# Patient Record
Sex: MALE | Race: WHITE
[De-identification: names, ages, dates, MRNs, and addresses within clinical notes are randomized per-mention and may not be internally consistent; named-entity substitution may affect disease eponyms.]

---

## 2018-03-24 ENCOUNTER — HOSPITAL ENCOUNTER (EMERGENCY)
Dept: HOSPITAL 97 - ER | Age: 18
LOS: 1 days | Discharge: HOME | End: 2018-03-25
Payer: SELF-PAY

## 2018-03-24 DIAGNOSIS — L02.415: Primary | ICD-10-CM

## 2018-03-24 DIAGNOSIS — Z88.1: ICD-10-CM

## 2018-03-24 DIAGNOSIS — R06.2: ICD-10-CM

## 2018-03-24 PROCEDURE — 96375 TX/PRO/DX INJ NEW DRUG ADDON: CPT

## 2018-03-24 PROCEDURE — 96372 THER/PROPH/DIAG INJ SC/IM: CPT

## 2018-03-24 PROCEDURE — 99284 EMERGENCY DEPT VISIT MOD MDM: CPT

## 2018-03-24 PROCEDURE — 96361 HYDRATE IV INFUSION ADD-ON: CPT

## 2018-03-24 PROCEDURE — 96374 THER/PROPH/DIAG INJ IV PUSH: CPT

## 2018-03-24 NOTE — ER
Nurse's Notes                                                                                     

 [facility]                                                                

Name: Antony Feliciano                                                                               

Age: 17 yrs                                                                                       

Sex: Male                                                                                         

: 2000                                                                                   

MRN: [id number]                                                                                   

Arrival Date: 2018                                                                          

Time: 19:53                                                                                       

Account#: [id number]                                                                            

Bed 3                                                                                             

Private MD:                                                                                       

Diagnosis: Cutaneous abscess of right axilla;Cutaneous abscess of right lower limb;Severe allergic

  reaction to po medication given in ED (Bactrim or Keflex)                                       

                                                                                                  

Presentation:                                                                                     

                                                                                             

20:29 Presenting complaint: Patient states: Small abscesses to right hip and right axilla;    lp1 

      Noticed them 1 week ago; Denies any fever. Transition of care: patient was not received     

      from another setting of care. Onset of symptoms was 2018. Care prior to           

      arrival: None.                                                                              

20:29 Method Of Arrival: Ambulatory                                                           lp1 

20:29 Acuity: NIMESH 4                                                                           lp1 

                                                                                                  

Historical:                                                                                       

- Allergies:                                                                                      

20:32 NKA;                                                                                    lp1 

- Home Meds:                                                                                      

20:32 None [Active];                                                                          lp1 

- PMHx:                                                                                           

20:32 Ulcers;                                                                                 lp1 

- PSHx:                                                                                           

20:32 None;                                                                                   lp1 

                                                                                                  

- Immunization history:: Adult Immunizations up to date.                                          

- Social history:: Smoking status: Patient/guardian denies using tobacco.                         

                                                                                                  

                                                                                                  

Screenin:33 Abuse screen: Denies threats or abuse. Denies injuries from another. Nutritional        lp1 

      screening: No deficits noted. Tuberculosis screening: No symptoms or risk factors           

      identified. Fall Risk None identified.                                                      

                                                                                                  

Assessment:                                                                                       

21:09 General: Appears in no apparent distress. uncomfortable, Behavior is calm, cooperative, bs1 

      appropriate for age. Pain: Complains of pain in right axilla, right hip Pain does not       

      radiate. Pain currently is 3 out of 10 on a pain scale. Neuro: Level of Consciousness       

      is awake, alert, obeys commands, Oriented to person, place, time, situation,                

      Appropriate for age  are equal bilaterally Moves all extremities. Gait is steady,      

      Speech is normal, Facial symmetry appears normal. Cardiovascular: Denies chest pain,        

      palpitations, shortness of breath, Heart tones S1 S2 present Capillary refill < 3           

      seconds Patient's skin is warm and dry. Respiratory: Airway is patent Trachea midline       

      Respiratory effort is even, unlabored, Respiratory pattern is regular, symmetrical,         

      Breath sounds are clear bilaterally. GI: No deficits noted. No signs and/or symptoms        

      were reported involving the gastrointestinal system. : No deficits noted. No signs        

      and/or symptoms were reported regarding the genitourinary system. EENT: No deficits         

      noted. No signs and/or symptoms were reported regarding the EENT system. Derm: Skin         

      abscess note to right axilla and right hip area. No open wound noted, no drainage. Skin     

      is pink, warm \T\ dry. Abscess located on right hip (quarter size), right axilla (dime      

      size). Musculoskeletal: Circulation, motion, and sensation intact. Capillary refill < 3     

      seconds, Range of motion: intact in all extremities.                                        

22:03 General: Hibiclens applied to right axilla/right hip..                                  bs1 

22:09 Reassessment: Patient appears in no apparent distress at this time. Patient and/or      bs1 

      family updated on plan of care and expected duration. Pain level reassessed. Patient is     

      alert, oriented x 3, equal unlabored respirations, skin warm/dry/pink. Patient denies       

      pain at this time. Patient states feeling better.                                           

22:50 Reassessment: Pt returned to [facility] after just a few minutes of being discharged with    fc  

      shortness of breath and hives. Pt also flushed. States that he is unable to catch his       

      breath. Pt taken to room and Nisa NP in to see him.                                       

23:00 Reassessment: Patient came back to ER for reaction to bactrim/keflex given to patient   bs1 

      prior to discharge from hospital. Generalized hives noted to body, SOB noted, wheezing.     

23:00 General: Appears distressed, Behavior is anxious.                                       bs1 

23:00 Pain: Denies pain. Neuro: Level of Consciousness is awake, alert, obeys commands,       bs1 

      Oriented to person, place, time, situation, Appropriate for age  are equal             

      bilaterally Moves all extremities. Gait is steady. Cardiovascular: Reports shortness of     

      breath, Denies chest pain, Heart tones S1 S2 present Capillary refill < 3 seconds           

      Patient's skin is warm and dry. Respiratory: Reports shortness of breath at rest on         

      exertion Airway is patent Trachea midline Respiratory effort is even, Respiratory           

      pattern is tachypnea expiratory wheezes heard on auscultation. GI: No deficits noted.       

      No signs and/or symptoms were reported involving the gastrointestinal system. : No        

      deficits noted. No signs and/or symptoms were reported regarding the genitourinary          

      system. EENT: No deficits noted. No signs and/or symptoms were reported regarding the       

      EENT system. Derm: Rash noted that is papular, urticaria, on generalized rash/hives.        

      Musculoskeletal: Circulation, motion, and sensation intact. Capillary refill < 3            

      seconds.                                                                                    

23:52 Reassessment: Patient appears in no apparent distress at this time. Patient is alert,   bs1 

      oriented x 3, equal unlabored respirations, skin warm/dry/pink. No signs of respiratory     

      distress noted. Lung sounds clear. Patient states symptoms have improved.                   

                                                                                             

01:46 Reassessment: Patient appears in no apparent distress at this time. Patient and/or      tl2 

      family updated on plan of care and expected duration. Pain level reassessed. Patient is     

      alert, oriented x 3, equal unlabored respirations, skin warm/dry/pink. Pt and family        

      verbalized understanding of discharge instructions, need for follow up and prescription     

      usage Patient states feeling better. Patient states symptoms have improved.                 

                                                                                                  

Vital Signs:                                                                                      

                                                                                             

20:32  / 84 RA Sitting; Pulse 69; Resp 18; Temp 98.4(TE); Pulse Ox 98% on R/A; Weight   lp1 

      99.79 kg; Height 5 ft. 6 in. (167.64 cm);                                                   

21:10  / 86; Pulse 60; Resp 18; Pulse Ox 100% on R/A;                                   mt  

22:24  / 75; Pulse 63; Resp 17; Temp 98.0(O); Pulse Ox 96% on R/A; Pain 0/10;           bs1 

23:52  / 86; Pulse 82; Resp 16; Pulse Ox 99% on R/A;                                    mt  

                                                                                             

00:00  / 69; Pulse 80; Resp 17; Pulse Ox 99% on R/A; Pain 0/10;                         bs1 

01:10  / 99; Pulse 73; Resp 17; Pulse Ox 99% on R/A;                                    mt  

01:46  / 69; Pulse 64; Resp 18; Pulse Ox 96% on R/A;                                    tl2 

                                                                                             

20:32 Body Mass Index 35.51 (99.79 kg, 167.64 cm)                                             lp1 

                                                                                                  

ED Course:                                                                                        

                                                                                             

19:53 Patient arrived in ED.                                                                  al2 

20:31 Triage completed.                                                                       lp1 

20:31 Arm band placed on left wrist.                                                          lp1 

21:06 [facility] dur[facility]C [facility]                                                        snw 

21:07 Ky Min MD is Attending Physician.                                             snw 

21:09 Na Kraft RN is Primary Nurse.                                                 bs1 

21:14 Patient has correct armband on for positive identification. Bed in low position. Call   bs1 

      light in reach. Side rails up X 1.                                                          

22:16 No provider procedures requiring assistance completed. Patient did not have IV access   bs1 

      during this emergency room visit.                                                           

22:50 Missed attempt(s): 20 gauge in right antecubital area.                                  fc  

22:53 Primary Nurse role handed off by Na Kraft, RN                                  em1 

23:02 Inserted saline lock: 22 gauge in right hand, using aseptic technique. By Naya feliciano.    bs1 

23:08 Na Kraft RN is Primary Nurse.                                                 bs1 

                                                                                             

01:46 IV discontinued, intact, bleeding controlled, No redness/swelling at site. Pressure     tl2 

      dressing applied.                                                                           

                                                                                                  

Administered Medications:                                                                         

                                                                                             

22:03 Drug: Hibiclens 4 % 1 application {Note: Right axilla/right hip.} Route: Topical; Site: bs1 

      affected area;                                                                              

22:04 Drug: KeFLEX 500 mg Route: PO;                                                          bs1 

22:25 Follow up: Response: No adverse reaction                                                bs1 

22:04 Drug: Bactrim (160 mg-800 mg (DS) 1 tablet Route: PO;                                   bs1 

22:25 Follow up: Response: No adverse reaction                                                bs1 

22:04 Not Given (Patient Refused): Norco 5 mg-325 mg 1 tabs PO once                           bs1 

23:00 Drug: EPINEPHrine 1mg/mL 1:1,000 0.3 ml Route: Sub-Q; Site: right upper arm;            tl2 

                                                                                             

00:19 Follow up: Response: No adverse reaction                                                bs1 

                                                                                             

23:03 Drug: Benadryl 25 mg {Note: Partially administered by Naya Feliciano RN.} Route: IVP; Site: bs1 

      right hand;                                                                                 

                                                                                             

00:18 Follow up: Response: No adverse reaction                                                bs1 

                                                                                             

23:04 Drug: Albuterol 2.5 mg Route: Inhalation;                                               lp1 

23:04 Drug: Pepcid 20 mg Route: IVP; Site: right hand;                                        bs1 

                                                                                             

00:18 Follow up: Response: No adverse reaction                                                bs1 

                                                                                             

23:04 Drug: SOLU-Medrol 125 mg {Note: Administered by Naya Feliciano RN.} Route: IVP; Site: right bs1 

      hand;                                                                                       

                                                                                             

00:18 Follow up: Response: No adverse reaction; Wheezing diminished                           bs1 

                                                                                             

23:22 Drug: NS 0.9% 1000 ml Route: IV; Rate: 1000 ml; Site: right hand;                       bs1 

                                                                                             

00:18 Follow up: IV Status: Completed infusion                                                bs1 

                                                                                                  

                                                                                                  

Outcome:                                                                                          

                                                                                             

21:44 Discharge ordered by MD.                                                                snw 

22:26 Discharged to home ambulatory.                                                          bs1 

22:26 Condition: stable                                                                           

22:26 Discharge instructions given to patient, Instructed on discharge instructions, follow       

      up and referral plans. medication usage, Demonstrated understanding of instructions,        

      follow-up care, medications, Prescriptions given X 2.                                       

22:26 Patient left the ED.                                                                    bs1 

                                                                                             

01:09 Discharge ordered by MD.                                                                adilene 

01:47 Patient left the ED.                                                                    tl2 

                                                                                                  

Signatures:                                                                                       

Nisa Soto, ABDONP-C                 FNP-Csnw                                                  

Ariadna Richey, RN                   RN   [place]                               em1                                                  

Sophia Dooley RN                         RN   lp1                                                  

Gloria Dickinson RN                        RN   tl2                                                  

Gemini Torres mt, Brittany RN                   RN   bs1                                                  

Lori Martino2                                                  

                                                                                                  

Corrections: (The following items were deleted from the chart)                                    

                                                                                             

20:35 20:32 Pulse 69bpm; Resp 18bpm; Pulse Ox 98% RA; Temp 98.4F Temporal; 99.79 kg; Height 5 lp1 

      ft. 6 in.; BMI: 35.5; lp1                                                                   

20:35 20:32 Pulse 69bpm; Resp 18bpm; Pulse Ox 98% RA; Temp 98.4F Temporal; 99.79 kg; Height 5 lp1 

      ft. 6 in.; BMI: 35.5; lp1                                                                   

                                                                                                  

**************************************************************************************************

## 2018-03-24 NOTE — EDPHYS
Physician Documentation                                                                           

 [facility]                                                                

Name: Antony Feliciano                                                                               

Age: 17 yrs                                                                                       

Sex: Male                                                                                         

: 2000                                                                                   

MRN: [id number]                                                                                   

Arrival Date: 2018                                                                          

Time: 19:53                                                                                       

Account#: [id number]                                                                            

Bed 3                                                                                             

Private MD:                                                                                       

ED Physician Ky Min                                                                      

HPI:                                                                                              

                                                                                             

21:40 This 17 yrs old  Male presents to ER via Ambulatory with complaints of Boil.   snw 

21:40 The patient presents with an abscess of the right hip and right axilla. Description:    snw 

      The affected area is very small, erythematous. Onset: The symptoms/episode                  

      began/occurred gradually, 7 day(s) ago, and became persistent. Possible cause(s):           

      unknown. Severity of symptoms: At their worst the symptoms were mild. The patient has       

      not experienced similar symptoms in the past. It is unknown whether or not the patient      

      has recently seen a physician.                                                              

                                                                                                  

Historical:                                                                                       

- Allergies:                                                                                      

20:32 NKA;                                                                                    lp1 

- Home Meds:                                                                                      

20:32 None [Active];                                                                          lp1 

- PMHx:                                                                                           

20:32 Ulcers;                                                                                 lp1 

- PSHx:                                                                                           

20:32 None;                                                                                   lp1 

                                                                                                  

- Immunization history:: Adult Immunizations up to date.                                          

- Social history:: Smoking status: Patient/guardian denies using tobacco.                         

                                                                                                  

                                                                                                  

ROS:                                                                                              

21:40 Constitutional: Negative for fever, chills, and weight loss, Eyes: Negative for injury, snw 

      pain, redness, and discharge, ENT: Negative for injury, pain, and discharge, Neck:          

      Negative for injury, pain, and swelling, Cardiovascular: Negative for chest pain,           

      palpitations, and edema, Respiratory: Negative for shortness of breath, cough,              

      wheezing, and pleuritic chest pain, Abdomen/GI: Negative for abdominal pain, nausea,        

      vomiting, diarrhea, and constipation, Back: Negative for injury and pain, : Negative      

      for injury, bleeding, discharge, and swelling, MS/Extremity: Negative for injury and        

      deformity, Neuro: Negative for headache, weakness, numbness, tingling, and seizure.         

21:40 Skin: Positive for abscess.                                                                 

                                                                                                  

Exam:                                                                                             

21:40 Constitutional:  This is a well developed, well nourished patient who is awake, alert,  snw 

      and in no acute distress. Head/Face:  Normocephalic, atraumatic. Eyes:  Pupils equal        

      round and reactive to light, extra-ocular motions intact.  Lids and lashes normal.          

      Conjunctiva and sclera are non-icteric and not injected.  Cornea within normal limits.      

      Periorbital areas with no swelling, redness, or edema. ENT:  Nares patent. No nasal         

      discharge, no septal abnormalities noted.  Tympanic membranes are normal and external       

      auditory canals are clear.  Oropharynx with no redness, swelling, or masses, exudates,      

      or evidence of obstruction, uvula midline.  Mucous membranes moist. Neck:  Trachea          

      midline, no thyromegaly or masses palpated, and no cervical lymphadenopathy.  Supple,       

      full range of motion without nuchal rigidity, or vertebral point tenderness.  No            

      Meningismus. Chest/axilla:  Normal chest wall appearance and motion.  Nontender with no     

      deformity.  No lesions are appreciated. Cardiovascular:  Regular rate and rhythm with a     

      normal S1 and S2.  No gallops, murmurs, or rubs.  Normal PMI, no JVD.  No pulse             

      deficits. Respiratory:  Lungs have equal breath sounds bilaterally, clear to                

      auscultation and percussion.  No rales, rhonchi or wheezes noted.  No increased work of     

      breathing, no retractions or nasal flaring. Abdomen/GI:  Soft, non-tender, with normal      

      bowel sounds.  No distension or tympany.  No guarding or rebound.  No evidence of           

      tenderness throughout. Back:  No spinal tenderness.  No costovertebral tenderness.          

      Full range of motion. MS/ Extremity:  Pulses equal, no cyanosis.  Neurovascular intact.     

       Full, normal range of motion. Neuro:  Awake and alert, GCS 15, oriented to person,         

      place, time, and situation.  Cranial nerves II-XII grossly intact.  Motor strength 5/5      

      in all extremities.  Sensory grossly intact.  Cerebellar exam normal.  Normal gait.         

      Psych:  Awake, alert, with orientation to person, place and time.  Behavior, mood, and      

      affect are within normal limits.                                                            

21:40 Skin: Appearance: normal except for affected area, abscess, that is small, of the right     

      hip and right axilla, cellulitis, is not appreciated.                                       

                                                                                                  

Vital Signs:                                                                                      

20:32  / 84 RA Sitting; Pulse 69; Resp 18; Temp 98.4(TE); Pulse Ox 98% on R/A; Weight   lp1 

      99.79 kg; Height 5 ft. 6 in. (167.64 cm);                                                   

21:10  / 86; Pulse 60; Resp 18; Pulse Ox 100% on R/A;                                   mt  

22:24  / 75; Pulse 63; Resp 17; Temp 98.0(O); Pulse Ox 96% on R/A; Pain 0/10;           bs1 

23:52  / 86; Pulse 82; Resp 16; Pulse Ox 99% on R/A;                                    mt  

                                                                                             

00:00  / 69; Pulse 80; Resp 17; Pulse Ox 99% on R/A; Pain 0/10;                         bs1 

01:10  / 99; Pulse 73; Resp 17; Pulse Ox 99% on R/A;                                    mt  

01:46  / 69; Pulse 64; Resp 18; Pulse Ox 96% on R/A;                                    tl2 

                                                                                             

20:32 Body Mass Index 35.51 (99.79 kg, 167.64 cm)                                             lp1 

                                                                                                  

MDM:                                                                                              

                                                                                             

21:16 Patient medically screened.                                                             snw 

21:46 Data reviewed: vital signs, nurses notes. Data interpreted: Pulse oximetry: on room air snw 

      is 100 %. Interpretation: normal. Counseling: I had a detailed discussion with the          

      patient and/or guardian regarding: the historical points, exam findings, and any            

      diagnostic results supporting the discharge/admit diagnosis, the presence of at least       

      one elevated blood pressure reading (>120/80) during this emergency department visit,       

      the need for outpatient follow up, to return to the emergency department if symptoms        

      worsen or persist or if there are any questions or concerns that arise at home. Special     

      discussion: I have referred the patient to see his PCP for further evaluation of high       

      blood pressure. Based on the history and exam findings, there is no indication for          

      further emergent testing or inpatient evaluation. I discussed with the patient/guardian     

      the need to see the primary care provider for further evaluation of the symptoms.           

22:55 ED course: Po meds taken at 2145 - 2245 with hives, wheezing, anxiety, swelling. Meds   snw 

      for allergic response given. Will continue to assess.                                       

23:44 Response to treatment: the patient's symptoms have markedly improved after treatment,   snw 

      pt with no wheezing, no further edema, rash resolving. Nasal congestion continues..         

                                                                                             

01:05 Response to treatment: the patient's symptoms have resolved after treatment, the        snw 

      patient's condition has returned to base line.                                              

                                                                                                  

Administered Medications:                                                                         

                                                                                             

22:03 Drug: Hibiclens 4 % 1 application {Note: Right axilla/right hip.} Route: Topical; Site: bs1 

      affected area;                                                                              

22:04 Drug: KeFLEX 500 mg Route: PO;                                                          bs1 

22:25 Follow up: Response: No adverse reaction                                                bs1 

22:04 Drug: Bactrim (160 mg-800 mg (DS) 1 tablet Route: PO;                                   bs1 

22:25 Follow up: Response: No adverse reaction                                                bs1 

22:04 Not Given (Patient Refused): Norco 5 mg-325 mg 1 tabs PO once                           bs1 

23:00 Drug: EPINEPHrine 1mg/mL 1:1,000 0.3 ml Route: Sub-Q; Site: right upper arm;            tl2 

                                                                                             

00:19 Follow up: Response: No adverse reaction                                                bs1 

                                                                                             

23:03 Drug: Benadryl 25 mg {Note: Partially administered by Naya Feliciano RN.} Route: IVP; Site: bs1 

      right hand;                                                                                 

                                                                                             

00:18 Follow up: Response: No adverse reaction                                                bs1 

                                                                                             

23:04 Drug: Albuterol 2.5 mg Route: Inhalation;                                               lp1 

23:04 Drug: Pepcid 20 mg Route: IVP; Site: right hand;                                        bs1 

                                                                                             

00:18 Follow up: Response: No adverse reaction                                                bs1 

                                                                                             

23:04 Drug: SOLU-Medrol 125 mg {Note: Administered by Naya Feliciano RN.} Route: IVP; Site: right bs1 

      hand;                                                                                       

                                                                                             

00:18 Follow up: Response: No adverse reaction; Wheezing diminished                           bs1 

                                                                                             

23:22 Drug: NS 0.9% 1000 ml Route: IV; Rate: 1000 ml; Site: right hand;                       bs1 

                                                                                             

00:18 Follow up: IV Status: Completed infusion                                                bs1 

                                                                                                  

                                                                                                  

Disposition:                                                                                      

18 01:09 Discharged to Home. Impression: Cutaneous abscess of right axilla, Cutaneous       

  abscess of right lower limb, Severe allergic reaction to po medication given                    

  in ED (Bactrim or Keflex).                                                                      

- Condition is Stable.                                                                            

- Discharge Instructions: Abscess, Drug Allergy, Epinephrine Injection, Hypertension.             

- Prescriptions for Clindamycin HCl 300 mg Oral Capsule - take 1 capsule by ORAL route            

  every 6 hours for 10 days; 40 capsule. Prednisone 20 mg Oral Tablet - take 2 tablet             

  by ORAL route once daily for 5 days; 10 tablet. Pepcid 20 mg Oral Tablet - take 1               

  tablet by ORAL route once daily; 20 tablet. EpiPen 0.3 mg Injection auto- injector -            

  inject 1 pen by INTRAMUSCULAR route as directed Inject into the outer portion of the            

  thigh, through clothing if necessary. Indicated in the emergency treatment of                   

  allergic reactions; 1 box.                                                                      

- Work release form, Medication Reconciliation Form, Thank You Letter, Antibiotic                 

  Education, Prescription Opioid Use form.                                                        

- Follow up: Private Physician; When: 1 - 2 days; Reason: Recheck today's complaints,             

  Continuance of care, Re-evaluation by your physician. Follow up: Emergency                      

  Department; When: As needed; Reason: Worsening of condition.                                    

                                                                                                  

                                                                                                  

                                                                                                  

Addendum:                                                                                         

2018                                                                                        

     07:05 Co-signature as Attending Physician, Ky Min MD I agree with the assessment and  w
a

           plan of care.                                                                          

                                                                                                  

Signatures:                                                                                       

Nisa Soto, FNP-C                 FNP-Csnw                                                  

Sophia Dooley RN                         RN   lp1                                                  

Gloria Dickinson RN                        RN   tl2                                                  

Ky Min MD MD   wa                                                   

Na Kraft, RN                   RN   bs1                                                  

                                                                                                  

**************************************************************************************************

## 2018-05-09 ENCOUNTER — HOSPITAL ENCOUNTER (EMERGENCY)
Dept: HOSPITAL 97 - ER | Age: 18
Discharge: HOME | End: 2018-05-09
Payer: SELF-PAY

## 2018-05-09 DIAGNOSIS — S52.612A: ICD-10-CM

## 2018-05-09 DIAGNOSIS — S52.512A: Primary | ICD-10-CM
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
## 2018-05-09 DIAGNOSIS — Z88.2: ICD-10-CM

## 2018-05-09 DIAGNOSIS — Z88.0: ICD-10-CM

## 2018-05-09 DIAGNOSIS — S90.02XA: ICD-10-CM

## 2018-05-09 PROCEDURE — 99285 EMERGENCY DEPT VISIT HI MDM: CPT
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
## 2023-05-25 ENCOUNTER — HOSPITAL ENCOUNTER (EMERGENCY)
Dept: HOSPITAL 97 - ER | Age: 23
Discharge: HOME | End: 2023-05-25
Payer: SELF-PAY

## 2023-05-25 DIAGNOSIS — Z88.0: ICD-10-CM

## 2023-05-25 DIAGNOSIS — L02.212: Primary | ICD-10-CM

## 2023-05-25 DIAGNOSIS — Z88.2: ICD-10-CM

## 2023-05-25 PROCEDURE — 99283 EMERGENCY DEPT VISIT LOW MDM: CPT

## 2023-05-25 NOTE — EDPHYS
Physician Documentation                                                                           

 [facility]                                                                 

Name: Antony Feliciano                                                                               

Age: 23 yrs                                                                                       

Sex: Male                                                                                         

: 2000                                                                                   

MRN: [id number]                                                                                   

Arrival Date: 2023                                                                          

Time: 17:58                                                                                       

Account#: [id number]                                                                            

Bed 9                                                                                             

Private MD:                                                                                       

ED Physician Reece Corral                                                                       

HPI:                                                                                              

                                                                                             

18:57 This 23 yrs old Male presents to ER via Ambulatory with complaints of Multiple boils on kb  

      back and arms.                                                                              

18:57 The patient presents with an abscess of the left low back. Description: erythematous,   kb  

      swollen, warm. Onset: The symptoms/episode began/occurred last week. Possible cause(s):     

      unknown. Associated signs and symptoms: Pertinent positives: drainage, erythema,            

      swelling. Modifying factors: the symptoms are alleviated by nothing, the symptoms are       

      aggravated by nothing. Severity of symptoms: At their worst the symptoms were mild,         

      moderate, in the emergency department the symptoms are unchanged. The patient has not       

      experienced similar symptoms in the past. The patient has not recently seen a physician.    

                                                                                                  

Historical:                                                                                       

- Allergies:                                                                                      

18:21 PENICILLINS;                                                                            bp  

18:21 Sulfa (Sulfonamide Antibiotics);                                                        bp  

- PMHx:                                                                                           

18:21 Ulcers;                                                                                 bp  

                                                                                                  

- Immunization history:: Adult Immunizations up to date.                                          

- Social history:: Smoking status: Reported history of juuling and/or vaping.                     

                                                                                                  

                                                                                                  

ROS:                                                                                              

18:55 Constitutional: Negative for fever, chills, and weight loss.                            kb  

18:55 Skin: Positive for abscess, of the back.                                                    

18:55 All other systems are negative.                                                             

                                                                                                  

Exam:                                                                                             

18:55 Constitutional:  This is a well developed, well nourished patient who is awake, alert,  kb  

      and in no acute distress. Head/Face:  Normocephalic, atraumatic. ENT:  Moist Mucous         

      membranes Respiratory:  Respirations even and unlabored. No increased work of               

      breathing. Talking in full sentences MS/ Extremity:  Pulses equal, no cyanosis.             

      Neurovascular intact.  Full, normal range of motion. Neuro:  Awake and alert, GCS 15,       

      oriented to person, place, time, and situation. Moves all extremities. Normal gait.         

18:55 Skin: abscess, that is small, of the left low back, with drainage, that is purulent,        

      several pustules noted to back.                                                             

                                                                                                  

Vital Signs:                                                                                      

18:20  / 75; Pulse 77; Resp 16; Temp 98; Pulse Ox 98% ;                                 bp  

                                                                                                  

MDM:                                                                                              

18:07 Patient medically screened.                                                             kb  

18:55 Differential diagnosis: abscess, cellulitis. Data reviewed: vital signs, nurses notes.  kb  

      Counseling: I had a detailed discussion with the patient and/or guardian regarding: the     

      historical points, exam findings, and any diagnostic results supporting the                 

      discharge/admit diagnosis, the need for outpatient follow up, a family practitioner, to     

      return to the emergency department if symptoms worsen or persist or if there are any        

      questions or concerns that arise at home.                                                   

                                                                                                  

Administered Medications:                                                                         

No medications were administered                                                                  

                                                                                                  

                                                                                                  

Disposition Summary:                                                                              

23 18:26                                                                                    

Discharge Ordered                                                                                 

      Location: Home                                                                          kb  

      Condition: Stable                                                                       kb  

      Diagnosis                                                                                   

        - Cutaneous abscess of back [any part, except buttock]                                kb  

      Followup:                                                                               kb  

        - With: Emergency Department                                                               

        - When: As needed                                                                          

        - Reason: Worsening of condition                                                           

      Followup:                                                                               kb  

        - With: Private Physician                                                                  

        - When: 2 - 3 days                                                                         

        - Reason: Recheck today's complaints, Continuance of care, Re-evaluation by your           

      physician                                                                                   

      Discharge Instructions:                                                                     

        - Discharge Summary Sheet                                                             kb  

        - Skin Abscess, Easy-to-Read                                                          kb  

      Forms:                                                                                      

        - Medication Reconciliation Form                                                      kb  

        - Thank You Letter                                                                    kb  

        - Antibiotic Education                                                                kb  

        - Prescription Opioid Use                                                             kb  

      Prescriptions:                                                                              

        - Clindamycin HCl 150 mg Oral Capsule                                                      

            - take 1 capsule by ORAL route every 6 hours for 10 days; 40 capsule; Refills: 0, kb  

      Product Selection Permitted                                                                 

Signatures:                                                                                       

Agueda Wall FNP-C FNP-Donald Castellanos, RN                      RN   bp                                                   

                                                                                                  

Corrections: (The following items were deleted from the chart)                                    

18:57 18:55 Skin: abscess, that is small, of the left low back, with drainage, that is        kb  

      purulent, kb                                                                                

                                                                                                  

**************************************************************************************************

## 2023-05-25 NOTE — ER
Nurse's Notes                                                                                     

 [facility]                                                                 

Name: Antony Feliciano                                                                               

Age: 23 yrs                                                                                       

Sex: Male                                                                                         

: 2000                                                                                   

MRN: [id number]                                                                                   

Arrival Date: 2023                                                                          

Time: 17:58                                                                                       

Account#: [id number]                                                                            

Bed 9                                                                                             

Private MD:                                                                                       

Diagnosis: Cutaneous abscess of back [any part, except buttock]                                   

                                                                                                  

Presentation:                                                                                     

                                                                                             

18:20 Chief complaint: Patient states: MX ABSCESS ON BACK. Coronavirus screen: At this time,  bp  

      the client does not indicate any symptoms associated with coronavirus-19. Ebola Screen:     

      No symptoms or risks identified at this time. Initial Sepsis Screen: Does the patient       

      meet any 2 criteria? No. Patient's initial sepsis screen is negative. Does the patient      

      have a suspected source of infection? No. Patient's initial sepsis screen is negative.      

      Risk Assessment: Do you want to hurt yourself or someone else? Patient reports no           

      desire to harm self or others. Onset of symptoms is unknown.                                

18:20 Method Of Arrival: Ambulatory                                                           bp  

18:20 Acuity: NIMESH 4                                                                           bp  

                                                                                                  

Triage Assessment:                                                                                

18:21 General: Appears uncomfortable, Behavior is calm, cooperative, appropriate for age.     bp  

      Pain: Complains of pain in back. EENT: No deficits noted. Neuro: No deficits noted.         

      Cardiovascular: No deficits noted. Respiratory: No deficits noted. GI: No signs and/or      

      symptoms were reported involving the gastrointestinal system. : No signs and/or           

      symptoms were reported regarding the genitourinary system. Derm: Abscess located on         

      back. Musculoskeletal: No deficits noted.                                                   

                                                                                                  

Historical:                                                                                       

- Allergies:                                                                                      

18:21 PENICILLINS;                                                                            bp  

18:21 Sulfa (Sulfonamide Antibiotics);                                                        bp  

- PMHx:                                                                                           

18:21 Ulcers;                                                                                 bp  

                                                                                                  

- Immunization history:: Adult Immunizations up to date.                                          

- Social history:: Smoking status: Reported history of juuling and/or vaping.                     

                                                                                                  

                                                                                                  

Screenin:22 [facility] ED Fall Risk Assessment (Adult) History of falling in the last 3 months,       bp  

      including since admission No falls in past 3 months (0 pts). Abuse screen: Denies           

      threats or abuse. Denies injuries from another. Nutritional screening: No deficits          

      noted. Tuberculosis screening: No symptoms or risk factors identified.                      

                                                                                                  

Assessment:                                                                                       

18:22 General: SEE TRIAGE NOTE.                                                               bp  

                                                                                                  

Vital Signs:                                                                                      

18:20  / 75; Pulse 77; Resp 16; Temp 98; Pulse Ox 98% ;                                 bp  

                                                                                                  

ED Course:                                                                                        

18:06 Patient arrived in ED.                                                                  ts1 

18:06 Agueda Wall FNP-C is [facility].                                                        kb  

18:06 Reece Corral MD is Attending Physician.                                              kb  

18:20 Donald Perdomo, RN is Primary Nurse.                                                    bp  

18:20 Triage completed.                                                                       bp  

18:22 Arm band placed on.                                                                     bp  

18:22 Patient has correct armband on for positive identification. Call light in reach.        bp  

18:34 No provider procedures requiring assistance completed. Patient did not have IV access   bp  

      during this emergency room visit.                                                           

                                                                                                  

Administered Medications:                                                                         

No medications were administered                                                                  

                                                                                                  

                                                                                                  

Medication:                                                                                       

18:22 VIS not applicable for this client.                                                     bp  

                                                                                                  

Outcome:                                                                                          

18:26 Discharge ordered by MD.                                                                kb  

18:34 Discharged to home ambulatory, with family.                                             bp  

18:34 Condition: stable                                                                           

18:34 Discharge instructions given to patient, Instructed on discharge instructions, follow       

      up and referral plans. medication usage, wound care, Demonstrated understanding of          

      instructions, follow-up care, medications, wound care, Prescriptions given X 1.             

18:34 Patient left the ED.                                                                    bp  

                                                                                                  

Signatures:                                                                                       

Agueda Wall FNP-C FNP-Abisaib                                                   

Donald Perdomo, RN                      RN   bp                                                   

Breanne Flores, PAS                     PAS  ts1                                                  

                                                                                                  

**************************************************************************************************

## 2023-07-31 ENCOUNTER — HOSPITAL ENCOUNTER (EMERGENCY)
Dept: HOSPITAL 97 - ER | Age: 23
Discharge: HOME | End: 2023-07-31
Payer: SELF-PAY

## 2023-07-31 VITALS — TEMPERATURE: 97.2 F | SYSTOLIC BLOOD PRESSURE: 132 MMHG | DIASTOLIC BLOOD PRESSURE: 80 MMHG | OXYGEN SATURATION: 99 %

## 2023-07-31 DIAGNOSIS — L02.212: Primary | ICD-10-CM

## 2023-07-31 PROCEDURE — 0H96XZZ DRAINAGE OF BACK SKIN, EXTERNAL APPROACH: ICD-10-PCS

## 2023-07-31 PROCEDURE — 99281 EMR DPT VST MAYX REQ PHY/QHP: CPT

## 2023-07-31 NOTE — EDPHYS
Physician Documentation                                                                           

 [facility]                                                                 

Name: Antony Feliciano                                                                               

Age: 23 yrs                                                                                       

Sex: Male                                                                                         

: 2000                                                                                   

MRN: [id number]                                                                                   

Arrival Date: 2023                                                                          

Time: 12:03                                                                                       

Account#: [id number]                                                                            

Bed Treatment                                                                                     

Private MD:                                                                                       

ED Physician Rico Gonzales                                                                     

HPI:                                                                                              

                                                                                             

12:53 This 23 yrs old Male presents to ER via Ambulatory with complaints of Skin Problem - on kb  

      back.                                                                                       

12:53 The patient presents with an abscess of the right scapular area. Description:           kb  

      erythematous, swollen. Onset: The symptoms/episode began/occurred 1 week(s) ago.            

      Possible cause(s): unknown. Associated signs and symptoms: Pertinent positives:             

      erythema, swelling. Modifying factors: the symptoms are alleviated by nothing, the          

      symptoms are aggravated by nothing. Severity of symptoms: At their worst the symptoms       

      were mild, in the emergency department the symptoms are unchanged. The patient has          

      experienced similar episodes in the past. The patient has not recently seen a physician.    

                                                                                                  

Historical:                                                                                       

- Allergies:                                                                                      

12:31 PENICILLINS;                                                                            iw  

12:31 Sulfa (Sulfonamide Antibiotics);                                                        iw  

- Home Meds:                                                                                      

12:31 None [Active];                                                                          iw  

- PMHx:                                                                                           

12:31 Ulcers;                                                                                 iw  

- PSHx:                                                                                           

12:31 None;                                                                                   iw  

                                                                                                  

- Immunization history:: Client reports having NOT received the Covid vaccine. Last               

  tetanus immunization: unknown.                                                                  

- Social history:: Smoking status: Reported history of juuling and/or vaping.                     

                                                                                                  

                                                                                                  

ROS:                                                                                              

12:52 Constitutional: Negative for fever, chills, and weight loss.                            kb  

12:52 Skin: Positive for abscess, of the right scapular area.                                     

12:52 All other systems are negative.                                                             

                                                                                                  

Exam:                                                                                             

12:52 Constitutional:  This is a well developed, well nourished patient who is awake, alert,  kb  

      and in no acute distress. Head/Face:  Normocephalic, atraumatic. ENT:  Moist Mucous         

      membranes Cardiovascular:  Regular rate and rhythm with a normal S1 and S2.  No             

      gallops, murmurs, or rubs.  No pulse deficits. Respiratory:  Respirations even and          

      unlabored. No increased work of breathing. Talking in full sentences MS/ Extremity:         

      Pulses equal, no cyanosis.  Neurovascular intact.  Full, normal range of motion. Neuro:     

       Awake and alert, GCS 15, oriented to person, place, time, and situation. Moves all         

      extremities. Normal gait.                                                                   

12:52 Skin: abscess, that is moderate sized, of the right scapular area, with drainage, with      

      fluctuance, that is mild.                                                                   

                                                                                                  

Vital Signs:                                                                                      

12:30  / 80; Pulse 70; Resp 16; Temp 97.2; Pulse Ox 99% on R/A; Weight 104.33 kg;       iw  

      Height 5 ft. 9 in. ;                                                                        

12:30 Body Mass Index 33.97 (104.33 kg, 175.26 cm)                                            iw  

                                                                                                  

Procedures:                                                                                       

12:53 I \T\ D: Incision and drainage was performed for an abscess of the right scapular area    kb

      Prepped with Betadine, Anesthetized with 2 ml's 1% Lidocaine. Incised with #11 blade.       

      Drained moderate amount Dressing: sterile 4x4 gauze, the patient tolerated the              

      procedure well.                                                                             

                                                                                                  

MDM:                                                                                              

12:08 Patient medically screened.                                                             kb  

12:55 Differential diagnosis: abscess, allergic reaction, cellulitis, insect bite. Data       kb  

      reviewed: vital signs, nurses notes. Counseling: I had a detailed discussion with the       

      patient and/or guardian regarding: the historical points, exam findings, and any            

      diagnostic results supporting the discharge/admit diagnosis, the need for outpatient        

      follow up, a family practitioner, to return to the emergency department if symptoms         

      worsen or persist or if there are any questions or concerns that arise at home.             

                                                                                                  

                                                                                             

12:13 Order name: I\T\D Setup; Complete Time: 12:38                                             kb

                                                                                                  

Administered Medications:                                                                         

No medications were administered                                                                  

                                                                                                  

                                                                                                  

Disposition:                                                                                      

15:35 Co-signature as Attending Physician, Rico Gonzales MD I reviewed the patient's care   rt  

      provided by the Advanced Practice Provider and agree with the diagnosis and treatment       

      plan.                                                                                       

                                                                                                  

Disposition Summary:                                                                              

23 12:55                                                                                    

Discharge Ordered                                                                                 

      Location: Home                                                                          kb  

      Condition: Stable                                                                       kb  

      Diagnosis                                                                                   

        - Cutaneous abscess of back [any part, except buttock]                                kb  

      Followup:                                                                               kb  

        - With: Emergency Department                                                               

        - When: As needed                                                                          

        - Reason: Worsening of condition                                                           

      Followup:                                                                               kb  

        - With: Private Physician                                                                  

        - When: 2 - 3 days                                                                         

        - Reason: Recheck today's complaints, Continuance of care, Re-evaluation by your           

      physician                                                                                   

      Discharge Instructions:                                                                     

        - Discharge Summary Sheet                                                             kb  

        - Skin Abscess, Easy-to-Read                                                          kb  

        - Incision and Drainage, Care After                                                   kb  

      Forms:                                                                                      

        - Medication Reconciliation Form                                                      kb  

        - Thank You Letter                                                                    kb  

        - Antibiotic Education                                                                kb  

        - Prescription Opioid Use                                                             kb  

        - Patient Portal Instructions                                                         kb  

        - Work release form                                                                   [id number] 

      Prescriptions:                                                                              

        - Doxycycline Hyclate 100 mg Oral Tablet                                                   

            - take 1 tablet by ORAL route every 12 hours; 20 tablet; Refills: 0, Product      kb  

      Selection Permitted                                                                         

Signatures:                                                                                       

Agueda Wall FNP-C FNP-Ckb Williams, Irene, RN                     RN   iw                                                   

Rico Gonzales MD MD   rt                                                   

                                                                                                  

**************************************************************************************************

## 2023-07-31 NOTE — ER
Nurse's Notes                                                                                     

 [facility]                                                                 

Name: Antony Feliciano                                                                               

Age: 23 yrs                                                                                       

Sex: Male                                                                                         

: 2000                                                                                   

MRN: [id number]                                                                                   

Arrival Date: 2023                                                                          

Time: 12:03                                                                                       

Account#: [id number]                                                                            

Bed Treatment                                                                                     

Private MD:                                                                                       

Diagnosis: Cutaneous abscess of back [any part, except buttock]                                   

                                                                                                  

Presentation:                                                                                     

                                                                                             

12:23 Coronavirus screen: At this time, the client does not indicate any symptoms associated  iw  

      with coronavirus-19. Risk Assessment: Do you want to hurt yourself or someone else?         

      Patient reports no desire to harm self or others. Onset of symptoms was 2023.      

12:23 Method Of Arrival: Ambulatory                                                           iw  

12:23 Acuity: NIMESH 3                                                                           iw  

12: Chief complaint: Patient states: has two boils on back and shoulder X 1 week. Ebola     iw  

      Screen: Patient negative for fever greater than or equal to 101.5 degrees Fahrenheit,       

      and additional compatible Ebola Virus Disease symptoms Patient denies exposure to           

      infectious person. Patient denies travel to an Ebola-affected area in the 21 days           

      before illness onset. No symptoms or risks identified at this time. Initial Sepsis          

      Screen: Does the patient meet any 2 criteria? No. Patient's initial sepsis screen is        

      negative. Does the patient have a suspected source of infection? No. Patient's initial      

      sepsis screen is negative.                                                                  

                                                                                                  

Historical:                                                                                       

- Allergies:                                                                                      

12:31 PENICILLINS;                                                                            iw  

12:31 Sulfa (Sulfonamide Antibiotics);                                                        iw  

- Home Meds:                                                                                      

12:31 None [Active];                                                                          iw  

- PMHx:                                                                                           

12:31 Ulcers;                                                                                 iw  

- PSHx:                                                                                           

12:31 None;                                                                                   iw  

                                                                                                  

- Immunization history:: Client reports having NOT received the Covid vaccine. Last               

  tetanus immunization: unknown.                                                                  

- Social history:: Smoking status: Reported history of juuling and/or vaping.                     

                                                                                                  

                                                                                                  

Vital Signs:                                                                                      

12:30  / 80; Pulse 70; Resp 16; Temp 97.2; Pulse Ox 99% on R/A; Weight 104.33 kg;       iw  

      Height 5 ft. 9 in. ;                                                                        

12:30 Body Mass Index 33.97 (104.33 kg, 175.26 cm)                                            iw  

                                                                                                  

ED Course:                                                                                        

12:05 Patient arrived in ED.                                                                  im  

12:08 [facility]                                                        kb  

12:08 Rico Gonzales MD is Attending Physician.                                            kb  

12:25 Triage completed.                                                                       iw  

12:25 Arm band placed on.                                                                     iw  

13:43 Ailyn Collins, RN is Primary Nurse.                                                   iw  

                                                                                                  

Administered Medications:                                                                         

No medications were administered                                                                  

                                                                                                  

                                                                                                  

Outcome:                                                                                          

12:55 Discharge ordered by MD.                                                                kb  

13:43 Patient left the ED.                                                                    iw  

                                                                                                  

Signatures:                                                                                       

Agueda Wall, FNP-C                 FNP-Ckb                                                   

Ailyn Collins, RN                     RN   iw                                                   

Faby Quintanilla                                                                                  

                                                                                                  

Corrections: (The following items were deleted from the chart)                                    

 12:23 Chief complaint: Patient states: bruising to right inner arm since yesterday ,    iw  

      denies injury , bruising to forehead today but dis not fall , also having back pain iw      

 12:23 Initial Sepsis Screen: Does the patient meet any 2 criteria? No. Patient's        iw  

      initial sepsis screen is negative. Does the patient have a suspected source of              

      infection? No. Patient's initial sepsis screen is negative. iw                              

 12:23 Ebola Screen: Patient negative for fever greater than or equal to 101.5 degrees   iw  

      Fahrenheit, and additional compatible Ebola Virus Disease symptoms Patient denies           

      exposure to infectious person. Patient denies travel to an Ebola-affected area in the       

      21 days before illness onset. iw                                                            

12: 12:23  / 92; Pulse 103bpm; Resp 16bpm; Pulse Ox 96% RA; Temp 98.3F; 53.52 kg;     iw  

      Height 5 ft. 6 in.; BMI: 19.0; iw                                                           

12: 12:30 Pulse 70bpm; Resp 16bpm; Pulse Ox 99% RA; Temp 97.2F; 104.33 kg; Height 5 ft. 9   iw  

      in.; BMI: 33.9; iw                                                                          

                                                                                                  

**************************************************************************************************

## 2024-09-10 ENCOUNTER — HOSPITAL ENCOUNTER (EMERGENCY)
Dept: HOSPITAL 97 - ER | Age: 24
Discharge: HOME | End: 2024-09-10
Payer: SELF-PAY

## 2024-09-10 VITALS — SYSTOLIC BLOOD PRESSURE: 144 MMHG | DIASTOLIC BLOOD PRESSURE: 98 MMHG

## 2024-09-10 VITALS — OXYGEN SATURATION: 99 % | TEMPERATURE: 98.4 F

## 2024-09-10 DIAGNOSIS — L03.115: ICD-10-CM

## 2024-09-10 DIAGNOSIS — L02.415: Primary | ICD-10-CM

## 2024-09-10 PROCEDURE — 99284 EMERGENCY DEPT VISIT MOD MDM: CPT

## 2024-09-10 PROCEDURE — 96372 THER/PROPH/DIAG INJ SC/IM: CPT

## 2024-09-10 NOTE — EDPHYS
Physician Documentation                                                                           

 [facility]                                                                 

Name: Antony Feliciano                                                                               

Age: 24 yrs                                                                                       

Sex: Male                                                                                         

: 2000                                                                                   

MRN: [id number]                                                                                   

Arrival Date: 09/10/2024                                                                          

Time: 00:21                                                                                       

Account#: [id number]                                                                            

Bed 15                                                                                            

Private MD:                                                                                       

ED Physician Mani Watson                                                                        

HPI:                                                                                              

09/10                                                                                             

00:34 This 24 yrs old Male presents to ER via Unassigned with complaints of Leg Swelling -    sb4 

      right.                                                                                      

00:34 The patient presents with an abscess of the right quadriceps and right knee.            sb4 

      Description: The affected area is small, draining, erythematous, raised, swollen,           

      tense, warm. Onset: The symptoms/episode began/occurred yesterday. Possible cause(s):       

      unknown. Associated signs and symptoms: The patient has no apparent associated signs or     

      symptoms. The patient has experienced similar episodes in the past, a few times,            

      today's symptoms are similar.                                                               

                                                                                                  

Historical:                                                                                       

- Allergies:                                                                                      

00:39 PENICILLINS;                                                                            ss  

00:39 Sulfa (Sulfonamide Antibiotics);                                                        ss  

- Home Meds:                                                                                      

00:39 None [Active];                                                                          ss  

- PMHx:                                                                                           

00:39 Ulcers;                                                                                 ss  

- PSHx:                                                                                           

00:39 None;                                                                                   ss  

                                                                                                  

- Immunization history:: Client reports having NOT received the Covid vaccine.                    

- Infectious Disease History:: Denies.                                                            

- Social history:: Smoking status: Reported history of juuling and/or vaping.                     

                                                                                                  

                                                                                                  

ROS:                                                                                              

00:34 Constitutional: Negative for fever, chills, and weight loss,                            sb4 

00:34 Skin: Positive for abscess, of the right knee and right quadriceps,                         

00:34 All other systems are negative,                                                             

                                                                                                  

Exam:                                                                                             

00:34 Constitutional:  This is a well developed, well nourished patient who is awake, alert,  sb4 

      and in no acute distress. Head/Face:  Normocephalic, atraumatic. Eyes:  Extra-ocular        

      motions intact.  Periorbital areas with no swelling, redness, or edema. ENT:  Mucous        

      membranes moist.                                                                            

00:34 Skin: abscess, that is small, approximately  3 cm(s), of the right knee, with drainage,     

      with surrounding cellulitis,                                                                

00:37 Skin: abscess, that is small, of the right quadriceps, with drainage, with induration,  sb4 

      with surrounding cellulitis,                                                                

                                                                                                  

Vital Signs:                                                                                      

00:37 Pulse 73; Resp 16; Temp 98.4(O); Pulse Ox 99% ; Weight 104.33 kg; Height 5 ft. 8 in. ;    

      Pain 10/10;                                                                                 

01:12  / 98; Pulse 87; Resp 18; Pulse Ox 99% on R/A;                                    al5 

00:37 Body Mass Index 34.97 (104.33 kg, 172.72 cm)                                            ss  

00:37 Pain Scale: Adult                                                                       ss  

                                                                                                  

MDM:                                                                                              

00:28 Patient medically screened.                                                             sb4 

00:37 Data reviewed: vital signs, nurses notes, and as a result, I will discharge patient.    sb4 

      Counseling: I had a detailed discussion with the patient and/or guardian regarding the      

      historical points, exam findings, and any diagnostic results supporting the                 

      discharge/admit diagnosis, to return to the emergency department if symptoms worsen or      

      persist or if there are any questions or concerns that arise at home.                       

                                                                                                  

Administered Medications:                                                                         

00:40 CANCELLED (Physician Discretion): trimethoprim-sulfamethoxazole(160 mg-800 mg (ds) 1    sb4 

      tablet PO once                                                                              

01:09 Drug: Ketorolac IM 30 mg IM once Route: IM; Site: right deltoid;                        al5 

01:10 Follow up: Response: No adverse reaction; Medication administered at discharge.         al5 

01:09 Drug: Clindamycin  mg PO once Route: PO;                                          al5 

01:09 Follow up: Response: No adverse reaction; Medication administered at discharge.         al5 

                                                                                                  

                                                                                                  

Disposition Summary:                                                                              

09/10/24 00:38                                                                                    

Discharge Ordered                                                                                 

 Notes:       Location: Home                                                                        
  sb4

      Problem: new                                                                            sb4 

      Symptoms: have improved                                                                 sb4 

      Condition: Stable                                                                       sb4 

      Diagnosis                                                                                   

        - Cutaneous abscess of right lower limb                                               sb4 

      Followup:                                                                               sb4 

        - With: Emergency Department                                                               

        - When: As needed                                                                          

        - Reason: Fever > 102 F, Worsening of condition                                            

      Discharge Instructions:                                                                     

        - Discharge Summary Sheet                                                             sb4 

        - Skin Abscess, Easy-to-Read                                                          sb4 

      Forms:                                                                                      

        - Antibiotic Education                                                                sb4 

        - Patient Portal Instructions                                                         sb4 

        - Leadership Thank You Letter                                                         sb4 

      Prescriptions:                                                                              

        - Clindamycin HCl 300 mg Oral Capsule                                                      

            - take 1 capsule ORAL route every 6 hours for 10 days; 40 capsule; Refills: 0,    sb4 

      Product Selection Permitted                                                                 

Addendum:                                                                                         

2024                                                                                        

     16:19 I was immediately available for consultation during this patient's visit. I did not     e
c2

           personally see the patient or discuss the patient with the ENRIQUE. .                      

                                                                                                  

Signatures:                                                                                       

Jasmine Zhong RN                   RN   Hilary Ayers PALUIS MANUEL                     PAAlaynaC sb4                                                  

Mani Watson MD MD   ec2                                                  

Zulema Black RN                   RN   al5                                                  

                                                                                                  

Corrections: (The following items were deleted from the chart)                                    

09/10                                                                                             

00:37 00:34 Skin: abscess, that is small, approximately 3 cm(s), of the right knee, sb4       sb4 

00:37 00:35 Skin: Appearance: abscess, sb4                                                    sb4 

00:37 00:35 Skin: Appearance: abscess, sb4                                                    sb4 

00:40 00:34 Trimethoprim-Sulfamethoxazole PO (160 mg-800 mg (DS) 1 tablet PO once ordered. sb4sb4 

                                                                                                  

**************************************************************************************************

## 2024-09-10 NOTE — ER
Nurse's Notes                                                                                     

 [facility]                                                                 

Name: Antony Feliciano                                                                               

Age: 24 yrs                                                                                       

Sex: Male                                                                                         

: 2000                                                                                   

MRN: [id number]                                                                                   

Arrival Date: 09/10/2024                                                                          

Time: 00:21                                                                                       

Account#: [id number]                                                                            

Bed 15                                                                                            

Private MD:                                                                                       

Diagnosis: Cutaneous abscess of right lower limb                                                  

                                                                                                  

Presentation:                                                                                     

09/10                                                                                             

00:37 Chief complaint: Patient states: 2 abscesses to R knee and R upper thigh that began 2   ss  

      days ago. Pt reports a history of these. Coronavirus screen: Client denies travel out       

      of the [place] in the last 14 days. Ebola Screen: Patient denies exposure to infectious        

      person. Patient denies travel to an Ebola-affected area in the 21 days before illness       

      onset. Initial Sepsis Screen: Does the patient meet any 2 criteria? No. Patient's           

      initial sepsis screen is negative. Does the patient have a suspected source of              

      infection? No. Patient's initial sepsis screen is negative. Risk Assessment: Do you         

      want to hurt yourself or someone else? Patient reports no desire to harm self or            

      others. Onset of symptoms was 2024.                                           

00:37 Method Of Arrival: Ambulatory                                                           ss  

00:37 Acuity: NIMESH 4                                                                           ss  

                                                                                                  

Historical:                                                                                       

- Allergies:                                                                                      

00:39 PENICILLINS;                                                                            ss  

00:39 Sulfa (Sulfonamide Antibiotics);                                                        ss  

- Home Meds:                                                                                      

00:39 None [Active];                                                                          ss  

- PMHx:                                                                                           

00:39 Ulcers;                                                                                 ss  

- PSHx:                                                                                           

00:39 None;                                                                                   ss  

                                                                                                  

- Immunization history:: Client reports having NOT received the Covid vaccine.                    

- Infectious Disease History:: Denies.                                                            

- Social history:: Smoking status: Reported history of juuling and/or vaping.                     

                                                                                                  

                                                                                                  

Screenin:10 [facility] ED Fall Risk Assessment (Adult) History of falling in the last 3 months,       al5 

      including since admission No falls in past 3 months (0 pts) Confusion or Disorientation     

      No (0 pts) Intoxicated or Sedated No (0 pts) Impaired Gait No (0 pts) Mobility Assist       

      Device Used No (0 pt) Altered Elimination No (0 pt) Score/Fall Risk Level 0 - 2 = Low       

      Risk Oriented to surroundings, Maintained a safe environment, Hourly rounding (assess       

      needs \T\ fall precautionary measures) done. Abuse screen: Denies threats or abuse.         

      Denies injuries from another. Nutritional screening: No deficits noted. Tuberculosis        

      screening: No symptoms or risk factors identified.                                          

                                                                                                  

Assessment:                                                                                       

01:11 General: Appears in no apparent distress. uncomfortable, Behavior is calm, cooperative. al5 

      Pain: Complains of pain in right leg. Neuro: Level of Consciousness is awake, alert,        

      obeys commands, Oriented to person, place, time, situation. Cardiovascular: Capillary       

      refill < 3 seconds Patient's skin is warm and dry. Respiratory: Airway is patent            

      Respiratory effort is even, unlabored, Respiratory pattern is regular, symmetrical. GI:     

      No signs and/or symptoms were reported involving the gastrointestinal system. : No        

      signs and/or symptoms were reported regarding the genitourinary system. EENT: No signs      

      and/or symptoms were reported regarding the EENT system. Derm: Skin is intact, Skin is      

      pink, warm \T\ dry. normal. Derm: Reports pain pain to r leg. Musculoskeletal: No signs     

      and/or symptoms reported regarding the musculoskeletal system.                              

                                                                                                  

Vital Signs:                                                                                      

00:37 Pulse 73; Resp 16; Temp 98.4(O); Pulse Ox 99% ; Weight 104.33 kg; Height 5 ft. 8 in. ;  ss  

      Pain 10/10;                                                                                 

01:12  / 98; Pulse 87; Resp 18; Pulse Ox 99% on R/A;                                    al5 

00:37 Body Mass Index 34.97 (104.33 kg, 172.72 cm)                                            ss  

00:37 Pain Scale: Adult                                                                       ss  

                                                                                                  

ED Course:                                                                                        

00:24 Patient arrived in ED.                                                                  im  

00:24 Hilary Mcclendon PA-C is PHCP.                                                            sb4 

00:24 Mani Watson MD is Attending Physician.                                               sb4 

00:39 Triage completed.                                                                       ss  

00:39 Arm band placed on right wrist.                                                         ss  

00:57 Zulema Black RN is Primary Nurse.                                                 al5 

01:10 No provider procedures requiring assistance completed. Patient did not have IV access   al5 

      during this emergency room visit.                                                           

01:11 Patient has correct armband on for positive identification. Bed in low position. Call   al5 

      light in reach. Side rails up X 1. Provided Education on: discharge follow up.              

                                                                                                  

Administered Medications:                                                                         

00:40 CANCELLED (Physician Discretion): trimethoprim-sulfamethoxazole(160 mg-800 mg (ds) 1    sb4 

      tablet PO once                                                                              

01:09 Drug: Ketorolac IM 30 mg IM once Route: IM; Site: right deltoid;                        al5 

01:10 Follow up: Response: No adverse reaction; Medication administered at discharge.         al5 

01:09 Drug: Clindamycin  mg PO once Route: PO;                                          al5 

01:09 Follow up: Response: No adverse reaction; Medication administered at discharge.         al5 

                                                                                                  

                                                                                                  

Medication:                                                                                       

01:10 VIS not applicable for this client.                                                     al5 

                                                                                                  

Outcome:                                                                                          

00:38 Discharge ordered by MD.                                                                sb4 

01:13 Discharged to home ambulatory, with friend,                                             al5 

01:13 Condition: good                                                                             

01:13 Discharge instructions given to patient, Instructed on discharge instructions, follow       

      up and referral plans. medication usage, Demonstrated understanding of instructions,        

      follow-up care, medications, Prescriptions given X 1,                                       

:13 Patient left the ED.                                                                    al5 

                                                                                                  

Signatures:                                                                                       

Jasmine Zhong RN                   Hilary Pete PA-C PA-C sb4                                                  

Faby Quintanilla Amanda, RN                   RN   al5                                                  

                                                                                                  

**************************************************************************************************

## 2025-03-29 ENCOUNTER — HOSPITAL ENCOUNTER (EMERGENCY)
Dept: HOSPITAL 97 - ER | Age: 25
LOS: 1 days | Discharge: HOME | End: 2025-03-30
Payer: SELF-PAY

## 2025-03-29 DIAGNOSIS — N47.1: Primary | ICD-10-CM

## 2025-03-29 PROCEDURE — 81001 URINALYSIS AUTO W/SCOPE: CPT

## 2025-03-29 PROCEDURE — 96372 THER/PROPH/DIAG INJ SC/IM: CPT

## 2025-03-29 PROCEDURE — 99284 EMERGENCY DEPT VISIT MOD MDM: CPT

## 2025-03-30 VITALS — OXYGEN SATURATION: 98 %

## 2025-03-30 VITALS — DIASTOLIC BLOOD PRESSURE: 91 MMHG | SYSTOLIC BLOOD PRESSURE: 135 MMHG | TEMPERATURE: 98 F

## 2025-03-30 LAB
UA COMPLETE W REFLEX CULTURE PNL UR: (no result)
UA COMPLETE W REFLEX CULTURE PNL UR: (no result)

## 2025-03-30 NOTE — ER
Nurse's Notes                                                                                     

 [facility]                                                                 

Name: Antony Feliciano                                                                               

Age: 24 yrs                                                                                       

Sex: Male                                                                                         

: 2000                                                                                   

MRN: [id number]                                                                                   

Arrival Date: 2025                                                                          

Time: 23:36                                                                                       

Account#: [id number]                                                                            

Bed 5                                                                                             

Private MD:                                                                                       

Diagnosis: Phimosis                                                                               

                                                                                                  

Presentation:                                                                                     

                                                                                             

00:29 Chief complaint: Patient states: i cant pull the fore skin of my penis back for a year  bm8 

      now. Coronavirus screen: At this time, the client does not indicate any symptoms            

      associated with coronavirus-19. Ebola Screen: Patient negative for fever greater than       

      or equal to 101.5 degrees Fahrenheit, and additional compatible Ebola Virus Disease         

      symptoms Patient denies exposure to infectious person. Patient denies travel to an          

      Ebola-affected area in the 21 days before illness onset. No symptoms or risks               

      identified at this time. Initial Sepsis Screen: Does the patient meet any 2 criteria?       

      No. Patient's initial sepsis screen is negative. Does the patient have a suspected          

      source of infection? No. Patient's initial sepsis screen is negative. Risk Assessment:      

      Do you want to hurt yourself or someone else? Patient reports no desire to harm self or     

      others. Onset of symptoms was 2024.                                               

00:29 Method Of Arrival: Ambulatory                                                           bm8 

00:29 Acuity: NIMESH 4                                                                           bm8 

                                                                                                  

Triage Assessment:                                                                                

00:31 General: Appears in no apparent distress. comfortable, Behavior is calm, cooperative,   bm8 

      appropriate for age. Pain: Complains of pain in head of penis Pain currently is 7 out       

      of 10 on a pain scale. Neuro: No deficits noted. Cardiovascular: No deficits noted.         

      Respiratory: No deficits noted. GI: No deficits noted. : Reports Unable to retract        

      fore skin over head of penis for over a year. Derm: No deficits noted. Musculoskeletal:     

      No deficits noted.                                                                          

                                                                                                  

Historical:                                                                                       

- Allergies:                                                                                      

00:31 PENICILLINS;                                                                            bm8 

00:31 Sulfa (Sulfonamide Antibiotics);                                                        bm8 

- Home Meds:                                                                                      

00:31 None [Active];                                                                          bm8 

- PMHx:                                                                                           

00:31 Ulcers;                                                                                 bm8 

- PSHx:                                                                                           

00:31 None;                                                                                   bm8 

                                                                                                  

- Immunization history:: Adult Immunizations up to date.                                          

- Infectious Disease History:: Denies.                                                            

- Social history:: Smoking status: Patient denies any tobacco usage or history of.                

                                                                                                  

                                                                                                  

Screenin:33 [facility] ED Fall Risk Assessment (Adult) History of falling in the last 3 months,       bm8 

      including since admission No falls in past 3 months (0 pts) Confusion or Disorientation     

      No (0 pts) Intoxicated or Sedated No (0 pts) Impaired Gait No (0 pts) Mobility Assist       

      Device Used No (0 pt) Altered Elimination No (0 pt) Score/Fall Risk Level 0 - 2 = Low       

      Risk Oriented to surroundings, Maintained a safe environment, Educated pt \T\ family on     

      fall prevention, incl call for assistance when getting out of bed, Assessed \T\             

      reinforced patient's understanding of fall precautions, Hourly rounding (assess needs \T\   

      fall precautionary measures) done, Used ambulatory aids as needed (educated on \T\          

      assisted with), Used gait belt as appropriate. Abuse screen: Denies threats or abuse.       

      Nutritional screening: No deficits noted. Tuberculosis screening: No symptoms or risk       

      factors identified.                                                                         

                                                                                                  

Assessment:                                                                                       

00:33 Reassessment: see triage assessment.                                                    bm8 

01:25 Reassessment: Patient appears in no apparent distress at this time. Patient and/or      bm8 

      family updated on plan of care and expected duration. Pain level reassessed. Patient is     

      alert, oriented x 3, equal unlabored respirations, skin warm/dry/pink. pt instructed to     

      follow up with Urology within the week Patient denies pain at this time.                    

                                                                                                  

Vital Signs:                                                                                      

00:15  / 105; Pulse 90; Resp 18; Temp 98; Pulse Ox 98% ; Weight 104.33 kg; Height 5 ft. vk  

      9 in. ; Pain 7/10;                                                                          

00:29  / 102; Pulse 90; Resp 18; Temp 98.1; Pulse Ox 98% ; Weight 104.33 kg; Height 5   bm8 

      ft. 9 in. ; Pain 7/10;                                                                      

01:25  / 91; Pulse 87; Resp 18; Temp 98; Pulse Ox 98% ; Pain 0/10;                      bm8 

00:29 Body Mass Index 33.96 (104.33 kg, 175.26 cm)                                            bm8 

00:15 Pain Scale: Adult                                                                       vk  

00:29 Pain Scale: Adult                                                                       bm8 

01:25 Pain Scale: Adult                                                                       bm8 

                                                                                                  

Erin Coma Score:                                                                               

00:33 Eye Response: spontaneous(4). Motor Response: obeys commands(6). Verbal Response:       bm8 

      oriented(5). Total: 15.                                                                     

01:25 Eye Response: spontaneous(4). Motor Response: obeys commands(6). Verbal Response:       bm8 

      oriented(5). Total: 15.                                                                     

                                                                                                  

ED Course:                                                                                        

                                                                                             

23:39 Patient arrived in ED.                                                                  mr  

23:48 Naldo Mendez PA is PHCP.                                                                cp  

23:48 Naldo Mccoy MD is Attending Physician.                                             cp  

                                                                                             

00:29 Cesar Contreras, RN is Primary Nurse.                                                    bm8 

00:31 Triage completed.                                                                       bm8 

00:31 Arm band placed on right wrist.                                                         bm8 

00:33 Patient has correct armband on for positive identification. Provided Education on: post bm8 

      er care. Client placed on continuous cardiac and pulse oximetry monitoring. NIBP            

      monitoring applied. Pulse ox on. NIBP on. Door closed. Noise minimized. Verbal              

      reassurance given. Head of bed elevated.                                                    

00:33 No provider procedures requiring assistance completed. Patient did not have IV access   bm8 

      during this emergency room visit.                                                           

00:57 Shan Lopez MD is Referral Physician.                                              cp  

                                                                                                  

Administered Medications:                                                                         

00:56 Drug: Ibuprofen  mg PO once Route: PO;                                            bm8 

01:24 Follow up: Response: No adverse reaction                                                bm8 

00:56 Drug: Acetaminophen PO 1000 mg PO once Route: PO;                                       bm8 

01:24 Follow up: Response: No adverse reaction                                                bm8 

01:10 Drug: Rocephin (cefTRIAXone) IM 1 grams IM once Route: IM; Site: left gluteus;          bm8 

01:24 Follow up: Response: No adverse reaction                                                bm8 

01:10 Drug: AZITHromycin PO 1 grams PO once Route: PO;                                        bm8 

01:24 Follow up: Response: No adverse reaction                                                bm8 

                                                                                                  

                                                                                                  

Medication:                                                                                       

00:33 VIS not applicable for this client.                                                     bm8 

                                                                                                  

Outcome:                                                                                          

00:58 Discharge ordered by MD.                                                                cp  

01:25 Discharged to home ambulatory,                                                          bm8 

01:25 Condition: stable                                                                           

01:25 Discharge instructions given to patient, Instructed on discharge instructions, follow       

      up and referral plans. no drinking with medication, no driving heavy equipment,             

      medication usage, safety practices, Demonstrated understanding of instructions,             

      follow-up care, medications, Prescriptions given X 3,                                       

01:27 Patient left the ED.                                                                    bm8 

                                                                                                  

Signatures:                                                                                       

Karen Murillo, Reg                       Reg  mr                                                   

Naldo Mendez PA PA cp Kruse, Vivian vk McDonald, Brad, RN                      RN   bm8                                                  

                                                                                                  

**************************************************************************************************

## 2025-03-30 NOTE — EDPHYS
Physician Documentation                                                                           

 [facility]                                                                 

Name: Antony Feliciano                                                                               

Age: 24 yrs                                                                                       

Sex: Male                                                                                         

: 2000                                                                                   

MRN: [id number]                                                                                   

Arrival Date: 2025                                                                          

Time: 23:36                                                                                       

Account#: [id number]                                                                            

Bed 5                                                                                             

Private MD:                                                                                       

ED Physician Naldo Mccoy                                                                      

HPI:                                                                                              

                                                                                             

00:35 This 24 yrs old Male presents to ER via Ambulatory with complaints of Penile swelling.  cp  

00:35 The patient presents with inability to retract foreskin past head of penis. Onset: The  cp  

      symptoms/episode began/occurred 1 year(s) ago, and became worse recently. Associated        

      signs and symptoms: Pertinent negatives: abdominal pain, dysuria, fever, hematuria.         

00:35 Severity of symptoms: in the emergency department the symptoms are unchanged, despite   cp  

      home interventions.                                                                         

                                                                                                  

Historical:                                                                                       

- Allergies:                                                                                      

00:31 PENICILLINS;                                                                            bm8 

00:31 Sulfa (Sulfonamide Antibiotics);                                                        bm8 

- Home Meds:                                                                                      

00:31 None [Active];                                                                          bm8 

- PMHx:                                                                                           

00:31 Ulcers;                                                                                 bm8 

- PSHx:                                                                                           

00:31 None;                                                                                   bm8 

                                                                                                  

- Immunization history:: Adult Immunizations up to date.                                          

- Infectious Disease History:: Denies.                                                            

- Social history:: Smoking status: Patient denies any tobacco usage or history of.                

                                                                                                  

                                                                                                  

ROS:                                                                                              

00:40 : Positive for inability to retract foreskin, Negative for urinary symptoms,          cp  

      hematuria, flank pain, penile pain, testicular pain                                         

00:40 Constitutional: Negative for body aches, chills, fever,                                 cp  

00:40 Abdomen/GI: Negative for abdominal pain,                                                    

00:40 Skin: Negative for rash,                                                                    

00:40 All other systems are negative,                                                             

                                                                                                  

Exam:                                                                                             

00:45 Constitutional: The patient appears in no acute distress, alert, awake, comfortable,    cp  

      non-toxic, well developed, well nourished,                                                  

00:45 Head/Face:  Normocephalic, atraumatic.                                                  cp  

00:45 Chest/axilla: Inspection: normal,                                                           

00:45 Cardiovascular: Rate: normal,                                                               

00:45 Respiratory: the patient does not display signs of respiratory distress,  Respirations:     

      normal, no use of accessory muscles, no retractions, labored breathing, is not present,     

00:45 Abdomen/GI: Inspection: abdomen appears normal, Palpation: abdomen is soft and              

      non-tender, in all quadrants,                                                               

00:45 : Male external genitalia: Patient is not circumisioned. erythema, is absent,             

      swelling: is not appreciated, foreskin drawn over head of penis and patient unable to       

      retract foreskin, Sexual behavior: the patient is sexually active,                          

                                                                                                  

Vital Signs:                                                                                      

00:15  / 105; Pulse 90; Resp 18; Temp 98; Pulse Ox 98% ; Weight 104.33 kg; Height 5 ft. vk  

      9 in. ; Pain 7/10;                                                                          

00:29  / 102; Pulse 90; Resp 18; Temp 98.1; Pulse Ox 98% ; Weight 104.33 kg; Height 5   bm8 

      ft. 9 in. ; Pain 7/10;                                                                      

01:25  / 91; Pulse 87; Resp 18; Temp 98; Pulse Ox 98% ; Pain 0/10;                      bm8 

00:29 Body Mass Index 33.96 (104.33 kg, 175.26 cm)                                            bm8 

00:15 Pain Scale: Adult                                                                       vk  

00:29 Pain Scale: Adult                                                                       bm8 

01:25 Pain Scale: Adult                                                                       bm8 

                                                                                                  

[place] Coma Score:                                                                               

00:33 Eye Response: spontaneous(4). Motor Response: obeys commands(6). Verbal Response:       bm8 

      oriented(5). Total: 15.                                                                     

01:25 Eye Response: spontaneous(4). Motor Response: obeys commands(6). Verbal Response:       bm8 

      oriented(5). Total: 15.                                                                     

                                                                                                  

MDM:                                                                                              

00:10 Medical Screening Exam initiated                                                        cp  

00:45 Differential diagnosis: UTI, prostatitis, urethritis, cellulitis, phimosis,             cp  

      paraphimosis.                                                                               

00:58 Data reviewed: vital signs, nurses notes, lab test result(s), and as a result, I will   cp  

      discharge patient.                                                                          

00:58 I considered the following discharge prescriptions or medication management in the        

      emergency department Medications were administered in the Emergency Department. See         

      MAR. Counseling: I had a detailed discussion with the patient and/or guardian regarding     

      the historical points, exam findings, and any diagnostic results supporting the             

      discharge/admit diagnosis, lab results, to return to the emergency department if            

      symptoms worsen or persist or if there are any questions or concerns that arise at home.    

                                                                                                  

                                                                                             

00:31 Order name: Urinalysis W/Microscopic                                                    cp  

                                                                                                  

Administered Medications:                                                                         

00:56 Drug: Ibuprofen  mg PO once Route: PO;                                            bm8 

01:24 Follow up: Response: No adverse reaction                                                bm8 

00:56 Drug: Acetaminophen PO 1000 mg PO once Route: PO;                                       bm8 

01:24 Follow up: Response: No adverse reaction                                                bm8 

01:10 Drug: Rocephin (cefTRIAXone) IM 1 grams IM once Route: IM; Site: left gluteus;          bm8 

01:24 Follow up: Response: No adverse reaction                                                bm8 

01:10 Drug: AZITHromycin PO 1 grams PO once Route: PO;                                        bm8 

01:24 Follow up: Response: No adverse reaction                                                bm8 

                                                                                                  

                                                                                                  

Disposition:                                                                                      

23:23 Chart complete.                                                                         cp  

                                                                                                  

Disposition Summary:                                                                              

25 00:58                                                                                    

Discharge Ordered                                                                                 

 Notes:       Location: Home                                                                        
  cp

      Problem: new                                                                            cp  

      Symptoms: have improved                                                                 cp  

      Condition: Stable                                                                       cp  

      Diagnosis                                                                                   

        - Phimosis                                                                            cp  

      Followup:                                                                               cp  

        - With: Shan Lopez MD                                                                

        - When: 1 week                                                                             

        - Reason: Recheck today's complaints                                                       

      Discharge Instructions:                                                                     

        - Discharge Summary Sheet                                                             cp  

        - Phimosis, Pediatric                                                                 cp  

        - Circumcision Information                                                            cp  

      Forms:                                                                                      

        - Medication Reconciliation Form                                                      cp  

        - Antibiotic Education                                                                cp  

        - Prescription Opioid Use                                                             cp  

        - Patient Portal Instructions                                                         cp  

        - Leadership Thank You Letter                                                         cp  

      Prescriptions:                                                                              

        - nystatin 100,000 unit/gram Topical cream                                                 

            - apply 1 application TOPICAL route 3 times per day for 10 days apply to foreskin cp  

      and head of penis; 30 gram tube; Refills: 0, Product Selection Permitted                    

        - Anaprox  mg Oral Tablet                                                            

            - take 1 tablet ORAL route every 12 hours As needed; 20 tablet; Refills: 0,       cp  

      Product Selection Permitted                                                                 

        - Cephalexin 500 mg Oral Capsule                                                           

            - take 1 capsule ORAL route every 6 hours for 10 days; 40 capsule; Refills: 0,    cp  

      Product Selection Permitted                                                                 

Addendum:                                                                                         

2025                                                                                        

     15:34 Co-signature as Attending Physician, Naldo Mccoy MD I agree with the assessment and  c
ha

           plan of care.                                                                          

                                                                                                  

Signatures:                                                                                       

Dispatcher [employer]                           Naldo Medel MD MD cha Page, Corey PA                         PA   cp                                                   

Cesar Contreras, RN                      RN   bm8                                                  

                                                                                                  

Corrections: (The following items were deleted from the chart)                                    

                                                                                             

23:17 23:15 : Positive for inability to retract foreskin, cp                                cp  

                                                                                                  

**************************************************************************************************

## 2025-04-06 ENCOUNTER — HOSPITAL ENCOUNTER (EMERGENCY)
Dept: HOSPITAL 97 - ER | Age: 25
Discharge: HOME | End: 2025-04-06
Payer: SELF-PAY

## 2025-04-06 VITALS — DIASTOLIC BLOOD PRESSURE: 87 MMHG | TEMPERATURE: 98.4 F | OXYGEN SATURATION: 97 % | SYSTOLIC BLOOD PRESSURE: 176 MMHG

## 2025-04-06 DIAGNOSIS — S93.691A: Primary | ICD-10-CM

## 2025-04-06 PROCEDURE — 99283 EMERGENCY DEPT VISIT LOW MDM: CPT
